# Patient Record
Sex: MALE | Race: OTHER | Employment: UNEMPLOYED | URBAN - METROPOLITAN AREA
[De-identification: names, ages, dates, MRNs, and addresses within clinical notes are randomized per-mention and may not be internally consistent; named-entity substitution may affect disease eponyms.]

---

## 2017-02-06 ENCOUNTER — OFFICE VISIT (OUTPATIENT)
Dept: HEMATOLOGY | Age: 57
End: 2017-02-06

## 2017-02-06 VITALS
BODY MASS INDEX: 27.11 KG/M2 | TEMPERATURE: 97.1 F | WEIGHT: 189.38 LBS | DIASTOLIC BLOOD PRESSURE: 69 MMHG | OXYGEN SATURATION: 93 % | HEIGHT: 70 IN | HEART RATE: 71 BPM | SYSTOLIC BLOOD PRESSURE: 122 MMHG

## 2017-02-06 DIAGNOSIS — B18.2 CHRONIC HEPATITIS C WITHOUT HEPATIC COMA (HCC): Primary | ICD-10-CM

## 2017-02-06 DIAGNOSIS — K74.60 CIRRHOSIS OF LIVER WITHOUT ASCITES, UNSPECIFIED HEPATIC CIRRHOSIS TYPE (HCC): ICD-10-CM

## 2017-02-06 PROBLEM — J44.9 COPD (CHRONIC OBSTRUCTIVE PULMONARY DISEASE) (HCC): Status: ACTIVE | Noted: 2017-02-06

## 2017-02-06 PROBLEM — D69.6 THROMBOCYTOPENIA (HCC): Status: ACTIVE | Noted: 2017-02-06

## 2017-02-06 PROBLEM — K70.31 ASCITES DUE TO ALCOHOLIC CIRRHOSIS (HCC): Status: ACTIVE | Noted: 2017-02-06

## 2017-02-06 PROBLEM — G89.29 CHRONIC MIDLINE LOW BACK PAIN: Status: ACTIVE | Noted: 2017-02-06

## 2017-02-06 PROBLEM — Z95.828 S/P TIPS (TRANSJUGULAR INTRAHEPATIC PORTOSYSTEMIC SHUNT): Status: ACTIVE | Noted: 2017-02-06

## 2017-02-06 PROBLEM — M54.50 CHRONIC MIDLINE LOW BACK PAIN: Status: ACTIVE | Noted: 2017-02-06

## 2017-02-06 RX ORDER — LACTULOSE 10 G/15ML
20 SOLUTION ORAL; RECTAL DAILY
Qty: 960 ML | Refills: 3 | Status: SHIPPED | OUTPATIENT
Start: 2017-02-06 | End: 2017-07-25 | Stop reason: SDUPTHER

## 2017-02-06 RX ORDER — FUROSEMIDE 40 MG/1
40 TABLET ORAL DAILY
Qty: 90 TAB | Refills: 3 | Status: SHIPPED | OUTPATIENT
Start: 2017-02-06

## 2017-02-06 RX ORDER — SPIRONOLACTONE 50 MG/1
100 TABLET, FILM COATED ORAL DAILY
Qty: 90 TAB | Refills: 3 | Status: SHIPPED | OUTPATIENT
Start: 2017-02-06

## 2017-02-06 RX ORDER — BACLOFEN 10 MG/1
10 TABLET ORAL
Qty: 90 TAB | Refills: 3 | Status: SHIPPED | OUTPATIENT
Start: 2017-02-06

## 2017-02-06 NOTE — MR AVS SNAPSHOT
Visit Information Date & Time Provider Department Dept. Phone Encounter #  
 2/6/2017  3:30 PM [facility] [address] [place] [address] [id number] Follow-up Instructions Return in about 2 weeks (around 2/20/2017) for FS with MLS. Upcoming Health Maintenance Date Due Hepatitis C Screening 1960 Pneumococcal 19-64 Medium Risk (1 of 1 - PPSV23) 8/26/1980 DTaP/Tdap/Td series (1 - Tdap) 8/26/1982 FOBT Q 1 YEAR AGE 50-75 8/26/2011 INFLUENZA AGE 9 TO ADULT 8/1/2016 Allergies as of 2/6/2017  Review Complete On: 2/6/2017 By: Dipti Kenny LPN No Known Allergies Current Immunizations  Never Reviewed No immunizations on file. Not reviewed this visit You Were Diagnosed With   
  
 Codes Comments Chronic hepatitis C without hepatic coma (HCC)    -  Primary ICD-10-CM: B18.2 ICD-9-CM: 070.54 Cirrhosis of liver without ascites, unspecified hepatic cirrhosis type ([facility]     ICD-10-CM: K74.60 ICD-9-CM: 571.5 Vitals BP Pulse Temp Height(growth percentile) Weight(growth percentile) SpO2  
 122/69 71 97.1 °F (36.2 °C) (Oral) 5' 10\" (1.778 m) 189 lb 6 oz (85.9 kg) 93% BMI Smoking Status 27.17 kg/m2 Current Every Day Smoker Vitals History BMI and BSA Data Body Mass Index Body Surface Area  
 27.17 kg/m 2 2.06 m 2 Preferred Pharmacy Pharmacy Name Phone [facility] [address] [place] [address] [phone] Your Updated Medication List  
  
   
This list is accurate as of: 2/6/17  5:00 PM.  Always use your most recent med list.  
  
  
  
  
 baclofen 10 mg tablet Commonly known as:  LIORESAL Take 1 Tab by mouth nightly. furosemide 40 mg tablet Commonly known as:  LASIX Take 1 Tab by mouth daily. lactulose 10 gram/15 mL solution Commonly known as:  Aloma Parent Take 30 mL by mouth daily. spironolactone 50 mg tablet Commonly known as:  ALDACTONE Take 2 Tabs by mouth daily. Prescriptions Sent to Pharmacy Refills  
 lactulose (CHRONULAC) 10 gram/15 mL solution 3 Sig: Take 30 mL by mouth daily. Class: Normal  
 Pharmacy: [address] Ph #: [phone] Route: Oral  
 furosemide (LASIX) 40 mg tablet 3 Sig: Take 1 Tab by mouth daily. Class: Normal  
 Pharmacy: [address] Ph #: [phone] Route: Oral  
 spironolactone (ALDACTONE) 50 mg tablet 3 Sig: Take 2 Tabs by mouth daily. Class: Normal  
 Pharmacy: [address] Ph #: [phone] Route: Oral  
 baclofen (LIORESAL) 10 mg tablet 3 Sig: Take 1 Tab by mouth nightly. Class: Normal  
 Pharmacy: [address] Ph #: [phone] Route: Oral  
  
We Performed the Following 7-DRUG SCREEN, WHOLE BLD I1640366 CPT(R)] AFP WITH AFP-L3% [[id number] Custom] CBC WITH AUTOMATED DIFF [63959 CPT(R)] ETHYL ALCOHOL [id number] CPT(R)] FERRITIN [61862 CPT(R)] HCV RNA BY DORI QL,RFLX TO QT [31290 CPT(R)] HCV RNA PCR QN RFX NS5A [id number] CPT(R)] HEP A AB, TOTAL S9473190 CPT(R)] HEP B SURFACE AB [id number] CPT(R)] HEP B SURFACE AG G7914129 CPT(R)] HEP C GENOTYPE [id number] CPT(R)] HEPATIC FUNCTION PANEL [13902 CPT(R)] HEPATITIS B CORE AB, TOTAL [id number] CPT(R)] IRON PROFILE [id number] CPT(R)] METABOLIC PANEL, BASIC [68501 CPT(R)] PROTHROMBIN TIME + INR [51142 CPT(R)] Follow-up Instructions Return in about 2 weeks (around 2/20/2017) for FS with MLS. To-Do List   
 02/06/2017 Imaging:  US ABD COMP Introducing hospitals & HEALTH SERVICES! Dear Lawyer Lima: Thank you for requesting a orangutrans account. Our records indicate that you already have an active orangutrans account.   You can access your account anytime at [email or web address] Did you know that you can access your hospital and ER discharge instructions at any time in CricHQ? You can also review all of your test results from your hospital stay or ER visit. Additional Information If you have questions, please visit the Frequently Asked Questions section of the CricHQ website at [email or web address] Remember, CricHQ is NOT to be used for urgent needs. For medical emergencies, dial 911. Now available from your iPhone and Android! Please provide this summary of care documentation to your next provider. Your primary care clinician is listed as Abdulaziz Lima. If you have any questions after today's visit, please call [phone].

## 2017-02-06 NOTE — LETTER
2/6/2017 7:15 PM 
 
RE:    Hina Moss [facility] [address] Thank you for agreeing to see [place] I am referring my patient to you for evaluation of ***. Please see his 
pertinent patient information below. {HISTORY MED SURG [facility] [id number]} I appreciate your assistance in Mr. Angela Montanez care  and look forward to your findings and recommendations. Sincerely, Jose A Morris MD

## 2017-02-06 NOTE — PROGRESS NOTES
Sloane Farnsworth MD, JEREMI Main PA-C Vinson Shutter, MD, Gregoria Edelson, MD Amy Delford Homans, JEREMI Brenner NP        [address] [facility], [address] [address] [place]  22.     [phone]     FAX: [address] [place] [address], [address] [place] [phone] [facility] [address]     [phone]     FAX: [phone]         Patient Care Team:  Gabriel Stewart MD as PCP - General (Family Practice)      Problem List  Date Reviewed: 2/6/2017          Codes Class Noted    Chronic hepatitis C without hepatic coma [facility]) ICD-10-CM: B18.2  ICD-9-CM: 070.54  2/6/2017        Cirrhosis of liver without ascites ([facility] ICD-10-CM: K74.60  ICD-9-CM: 571.5  2/6/2017        Thrombocytopenia ([facility] ICD-10-CM: D69.6  ICD-9-CM: 287.5  2/6/2017        Chronic midline low back pain ICD-10-CM: M54.5, G89.29  ICD-9-CM: 724.2, 338.29  2/6/2017        Ascites due to alcoholic cirrhosis ([facility] [id number]-[id number]: K70.31  ICD-9-CM: 571.2  2/6/2017        S/P TIPS (transjugular intrahepatic portosystemic shunt) ICD-10-CM: K20.091  ICD-9-CM: V45.89  2/6/2017        COPD (chronic obstructive pulmonary disease) [facility]) ICD-10-CM: J44.9  ICD-9-CM: 099  2/6/2017                The physicians listed above have asked me to see Becky Medina in consultation regarding cirrhosis secondary to chronic HCV and alcohol and its management. All medical records sent by the referring physicians were reviewed     The patient is a 55 y.o.  male who was noted to have abnormalities in liver chemistries and subsequently tested positive for chronic HCV in 2012. Risk factors for acquiring HCV are IV drug use in 1970-80s. Imaging of the liver was not recently performed. Ascites developed for the first time in 2012.   Ascites was refractory to diuretics and treated with resolved with TIPS. Edema has resolved with with diuretics and placement of TIPS. The patient has not developed hepatic encephalopathy. The patient has never received treatment for chronic HCV. The most recent laboratory studies are not available. The patient notes fatigue, diffuse abdominal pain,     The patient has not experienced problems concentrating, swelling of the abdomen, swelling of the lower extremities, hematemesis, hematochezia. The patient has limitations in functional activities which can be attributed to the liver disease. and to other medical problems that are not related to the liver disease. ALLERGIES  No Known Allergies    MEDICATIONS  Current Outpatient Prescriptions   Medication Sig    FUROSEMIDE (LASIX PO) Take 80 mg by mouth.  spironolactone (ALDACTONE) 50 mg tablet Take 50 mg by mouth daily.  lactulose (KRISTALOSE) 10 gram packet Take 10 g by mouth daily. No current facility-administered medications for this visit. SYSTEM REVIEW NOT RELATED TO LIVER DISEASE OR REVIEWED ABOVE:  Constitution systems: Negative for fever, chills, weight gain, weight loss. Eyes: Negative for visual changes. ENT: Negative for sore throat, painful swallowing. Respiratory: Negative for cough, hemoptysis, SOB. Cardiology: Negative for chest pain, palpitations. GI:  Negative for constipation or diarrhea. : Negative for urinary frequency, dysuria, hematuria, nocturia. Skin: Negative for rash. Hematology: Negative for easy bruising, blood clots. Musculo-skelatal: Severe chronic back pain for which he continues to use IV narcotics because he cannot get sufficient pain medications. Neurologic: Negative for headaches, dizziness, vertigo, memory problems not related to HE. Psychology: Negative for anxiety, depression. FAMILY HISTORY:  The father  of at age 80 years. The mother  of hear disease.     There is no family history of liver disease. SOCIAL HISTORY:  The patient is . The patient has 2 children, and 1 grandchildren. The patient currently smokes 4-6 cigarettes daily. The patient consumes 3-4 alcoholic beverages per day. The patient is currently receiving disability. PHYSICAL EXAMINATION:  Visit Vitals    /69    Pulse 71    Temp 97.1 °F (36.2 °C) (Oral)    Ht 5' 10\" (1.778 m)    Wt 189 lb 6 oz (85.9 kg)    SpO2 93%    BMI 27.17 kg/m2     General: No acute distress. Eyes: Sclera anicteric. ENT: No oral lesions. Thyroid normal.  Nodes: No adenopathy. Skin: No spider angiomata. No jaundice. No palmar erythema. Respiratory: Lungs clear to auscultation. Cardiovascular: Regular heart rate. No murmurs. No JVD. Abdomen: Soft non-tender. Liver size normal to percussion/palpation. Spleen not palpable. No obvious ascites. Extremities: No edema. No muscle wasting. No gross arthritic changes. Neurologic: Alert and oriented. Cranial nerves grossly intact. No asterixis. LABORATORY STUDIES:  [facility] of [address] [place] [address] & Units 8/8/2015 9/4/2013   WBC 4.1 - 11.1 K/uL 7.6 5.5   ANC 1.8 - 8.0 K/UL 4.1 2.8   HGB 12.1 - 17.0 g/dL 13.9 13.0    - 400 K/uL 70 (L) 64 (L)   AST 15 - 37 U/L 110 (H) 129 (H)   ALT 12 - 78 U/L 85 (H) 90 (H)   Alk Phos 45 - 117 U/L 100 106   Bili, Total 0.2 - 1.0 MG/DL 3.2 (H) 2.3 (H)   Albumin 3.5 - 5.0 g/dL 2.7 (L) 2.9 (L)   BUN 6 - 20 MG/DL 8 9   Creat 0.45 - 1.15 MG/DL 0.82 0.62   Na 136 - 145 mmol/L 143 136   K 3.5 - 5.1 mmol/L 4.0 3.0 (L)   Cl 97 - 108 mmol/L 110 (H) 98   CO2 21 - 32 mmol/L 24 26   Glucose 65 - 100 mg/dL 82 106 (H)     SEROLOGIES:  Not available or performed. Testing was performed today.     LIVER HISTOLOGY:  Not available or performed    ENDOSCOPIC PROCEDURES:  Not available or performed    RADIOLOGY:  Not available or performed    OTHER TESTING:  Not available or performed    ASSESSMENT AND PLAN:  Chronic HCV of unclear severity. Liver function is depressed. Total bilirubin is elevated. Serum albumin is depressed. The platelet count is depressed. Based upon laboratory studies the patient appears to have cirrhosis. Will perform laboratory testing to monitor liver function and degree of liver injury. This included BMP, hepatic panel, CBC with platelet count, INR. Will perform and/or review results of HCV viral load and HCV genotype to define the specific treatment and duration of treatment that will be required. Will perform serologic and virologic studies to assess for other causes of chronic liver disease. There is no reason to perform liver biopsy at this time. The Fibroscan can assess liver fibrosis and determine if a patient has advanced fibrosis or cirrhosis without the need for liver biopsy. The Fibroscan is currently available at [facility]. This will be scheduled. The patient has not previously been treated for HCV. Discussed the treatment alternatives. The SVR/cure rate for HCV now exceeds 90% with just oral anti-viral therapy and no interferon injections or significant side effects for most patients with HCV. The specific treatment is dependent upon genotype, viral load and histology. The patient was directed to continue all current medications at the current dosages. There are no contraindications for the patient to take any medications that are necessary for treatment of other medical issues. The patient has chronic back pain. He had been taking prescription narcotics for pain control. This was stopped by his other physicians and he has had to resort to buying narcotics on the street for pain control. He is scheduled to se Dr Shanna Jennings for pain management. There is no contraindication for him to be treated   Any narcotic pain medication, suboxone, or methadone as necessary to get him to stop using IV drugs.     If he continues to use drugs he would not be a good candidate for HCV treatment as there would be a significant risk of reexposure to HCV. Ascites has resolved following TIPS placement and diuretics. Lower extremity edema persistents despite TIPs and current dose of diuretics. Hepatic encephalopathy has recently recovered since he ran out of lactulose. Will re-initiate treatment with  Lactulose. No need to restrict dietary protein at this time. The patient was counseled regarding alcohol consumption. The patient was counseled regarding use of illicit drugs. The need for vaccination against viral hepatitis A and B will be assessed with serologic and instituted as appropriate. [facility]. screening will be performed. AFP was ordered today and ultrasound will be scheduled. 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 No treatment necessary. Platelet count is adequate     Anemia may be secondary to low iron stores. Will check ferritin and iron panel. If iron deficient will supplement with oral iron and evaluate for GI blood loss. All of the above issues were discussed with the patient. All questions were answered. The patient expressed a clear understanding of the above. [address] in 2 weeks to review all data and determine the treatment plan.     Colin Porter MD  [facility] [place] of [address] [facility] 2718 [facility] [address], [facility] 22.  [phone]

## 2017-02-07 ENCOUNTER — TELEPHONE (OUTPATIENT)
Dept: HEMATOLOGY | Age: 57
End: 2017-02-07

## 2017-02-07 LAB
ALBUMIN SERPL-MCNC: 3.1 G/DL (ref 3.5–5.5)
ALP SERPL-CCNC: 81 IU/L (ref 39–117)
ALT SERPL-CCNC: 89 IU/L (ref 0–44)
AST SERPL-CCNC: 152 IU/L (ref 0–40)
BASOPHILS # BLD AUTO: 0 X10E3/UL (ref 0–0.2)
BASOPHILS NFR BLD AUTO: 0 %
BILIRUB DIRECT SERPL-MCNC: 1.25 MG/DL (ref 0–0.4)
BILIRUB SERPL-MCNC: 2.2 MG/DL (ref 0–1.2)
BUN SERPL-MCNC: 12 MG/DL (ref 6–24)
BUN/CREAT SERPL: 16 (ref 9–20)
CALCIUM SERPL-MCNC: 8.8 MG/DL (ref 8.7–10.2)
CHLORIDE SERPL-SCNC: 105 MMOL/L (ref 96–106)
CO2 SERPL-SCNC: 26 MMOL/L (ref 18–29)
CREAT SERPL-MCNC: 0.77 MG/DL (ref 0.76–1.27)
EOSINOPHIL # BLD AUTO: 0 X10E3/UL (ref 0–0.4)
EOSINOPHIL NFR BLD AUTO: 1 %
ERYTHROCYTE [DISTWIDTH] IN BLOOD BY AUTOMATED COUNT: 14.5 % (ref 12.3–15.4)
ETHANOL BLD GC-MCNC: NEGATIVE %
FERRITIN SERPL-MCNC: 464 NG/ML (ref 30–400)
GLUCOSE SERPL-MCNC: 89 MG/DL (ref 65–99)
HAV AB SER QL IA: NEGATIVE
HBV CORE AB SERPL QL IA: POSITIVE
HBV SURFACE AB SER QL: REACTIVE
HBV SURFACE AG SERPL QL IA: NEGATIVE
HCT VFR BLD AUTO: 38.4 % (ref 37.5–51)
HGB BLD-MCNC: 13.3 G/DL (ref 12.6–17.7)
IMM GRANULOCYTES # BLD: 0 X10E3/UL (ref 0–0.1)
IMM GRANULOCYTES NFR BLD: 0 %
INR PPP: 1.3 (ref 0.8–1.2)
IRON SATN MFR SERPL: 43 % (ref 15–55)
IRON SERPL-MCNC: 87 UG/DL (ref 38–169)
LYMPHOCYTES # BLD AUTO: 1.4 X10E3/UL (ref 0.7–3.1)
LYMPHOCYTES NFR BLD AUTO: 35 %
MCH RBC QN AUTO: 32.2 PG (ref 26.6–33)
MCHC RBC AUTO-ENTMCNC: 34.6 G/DL (ref 31.5–35.7)
MCV RBC AUTO: 93 FL (ref 79–97)
MONOCYTES # BLD AUTO: 0.6 X10E3/UL (ref 0.1–0.9)
MONOCYTES NFR BLD AUTO: 14 %
MORPHOLOGY BLD-IMP: ABNORMAL
NEUTROPHILS # BLD AUTO: 1.9 X10E3/UL (ref 1.4–7)
NEUTROPHILS NFR BLD AUTO: 50 %
PLATELET # BLD AUTO: 65 X10E3/UL (ref 150–379)
POTASSIUM SERPL-SCNC: 4.6 MMOL/L (ref 3.5–5.2)
PROT SERPL-MCNC: 5.5 G/DL (ref 6–8.5)
PROTHROMBIN TIME: 13.4 SEC (ref 9.1–12)
RBC # BLD AUTO: 4.13 X10E6/UL (ref 4.14–5.8)
SODIUM SERPL-SCNC: 143 MMOL/L (ref 134–144)
TIBC SERPL-MCNC: 204 UG/DL (ref 250–450)
UIBC SERPL-MCNC: 117 UG/DL (ref 111–343)
WBC # BLD AUTO: 3.9 X10E3/UL (ref 3.4–10.8)

## 2017-02-07 NOTE — LETTER
2/9/2017 1:02 PM 
 
Mr. Chon Navas 
[address] [address] To whom it may concern; The patient listed above may have pain medication as needed. Any questions please call the office. Sincerely, Laura Oviedo MD

## 2017-02-07 NOTE — TELEPHONE ENCOUNTER
Patients spouse called and said that he had came in yesterday for an appointment and was told a letter would be sent to Dr. Saw Ward that states he can be given pain medication. Please fax this letter to his office at the [address] [place] [address], their fax number is [phone].  Please give his spouse a call back when this has been done at [phone]

## 2017-02-08 LAB
AFP L3 MFR SERPL: 9.6 % (ref 0–9.9)
AFP SERPL-MCNC: 4.1 NG/ML (ref 0–8)
HCV GENTYP SERPL NAA+PROBE: NORMAL
PLEASE NOTE, 550474: NORMAL

## 2017-02-09 LAB
HCV RNA SERPL NAA+PROBE-ACNC: NORMAL IU/ML
HCV RNA SERPL NAA+PROBE-LOG IU: 4.2 LOG10 IU/ML
HCV RNA SERPL QL NAA+PROBE: POSITIVE
TEST INFORMATION: NORMAL

## 2017-02-11 LAB
BZE SPEC-MCNC: 79 NG/ML
COCAINE SPEC QL CFM: POSITIVE
COCAINE SPEC-MCNC: NEGATIVE NG/ML

## 2017-02-18 LAB
6-ACETYLMORPHINE: NEGATIVE
AMPHETAMINES BLD QL SCN: NEGATIVE NG/ML
BARBITURATES SERPLBLD QL: NEGATIVE UG/ML
CANNABINOIDS BLD QL SCN: NEGATIVE NG/ML
COCAINE+BZE SERPLBLD QL SCN: ABNORMAL NG/ML
CODEINE, 737848: NEGATIVE NG/ML
DIHYDROCODEINE, 764159: NEGATIVE NG/ML
HYDROCODONE, 737854: NEGATIVE NG/ML
HYDROMORPHONE, 737852: NEGATIVE NG/ML
MORPHINE, 737850: 13.8 NG/ML
OPIATES BLD QL SCN: ABNORMAL NG/ML
OPIATES SPEC QL: POSITIVE
OXYCODONES, 738315: NEGATIVE NG/ML
PCP BLD QL SCN: NEGATIVE NG/ML

## 2017-02-21 ENCOUNTER — HOSPITAL ENCOUNTER (OUTPATIENT)
Dept: ULTRASOUND IMAGING | Age: 57
Discharge: HOME | End: 2017-02-21
Attending: INTERNAL MEDICINE
Payer: MEDICARE

## 2017-02-21 DIAGNOSIS — K74.60 CIRRHOSIS OF LIVER WITHOUT ASCITES, UNSPECIFIED HEPATIC CIRRHOSIS TYPE (HCC): ICD-10-CM

## 2017-02-21 PROCEDURE — 76700 US EXAM ABDOM COMPLETE: CPT

## 2017-03-21 ENCOUNTER — OFFICE VISIT (OUTPATIENT)
Dept: HEMATOLOGY | Age: 57
End: 2017-03-21

## 2017-03-21 VITALS
SYSTOLIC BLOOD PRESSURE: 126 MMHG | HEIGHT: 70 IN | WEIGHT: 180.13 LBS | BODY MASS INDEX: 25.79 KG/M2 | TEMPERATURE: 98.1 F | OXYGEN SATURATION: 97 % | HEART RATE: 67 BPM | DIASTOLIC BLOOD PRESSURE: 74 MMHG

## 2017-03-21 DIAGNOSIS — B18.2 CHRONIC HEPATITIS C WITHOUT HEPATIC COMA (HCC): Primary | ICD-10-CM

## 2017-03-21 NOTE — LETTER
4/16/2017 3:54 PM 
 
RE:    Hina Moss [facility] [address] Thank you for agreeing to see [facility] I am referring my patient to you for evaluation of ***. Please see his 
pertinent patient information below. {[facility] MED SURG [facility] [id number]} I appreciate your assistance in Mr. Mitchell Fulton care  and look forward to your findings and recommendations. Sincerely, Lillian Crowe MD

## 2017-03-21 NOTE — MR AVS SNAPSHOT
Visit Information Date & Time Provider Department Dept. Phone Encounter #  
 3/21/2017  4:00 PM Luna Cuenca MD [facility] [address] [place] [address] [id number] Follow-up Instructions Return for NP. Upcoming Health Maintenance Date Due Pneumococcal 19-64 Medium Risk (1 of 1 - PPSV23) 8/26/1980 DTaP/Tdap/Td series (1 - Tdap) 8/26/1982 FOBT Q 1 YEAR AGE 50-75 8/26/2011 INFLUENZA AGE 9 TO ADULT 8/1/2016 Allergies as of 3/21/2017  Review Complete On: 3/21/2017 By: Em Palomo LPN No Known Allergies Current Immunizations  Never Reviewed No immunizations on file. Not reviewed this visit Vitals BP Pulse Temp Height(growth percentile) Weight(growth percentile) SpO2  
 126/74 67 98.1 °F (36.7 °C) (Tympanic) 5' 10\" (1.778 m) 180 lb 2 oz (81.7 kg) 97% BMI Smoking Status 25.85 kg/m2 Current Every Day Smoker BMI and BSA Data Body Mass Index Body Surface Area  
 25.85 kg/m 2 2.01 m 2 Preferred Pharmacy Pharmacy Name Phone [facility] [address] [place] [address] [phone] Your Updated Medication List  
  
   
This list is accurate as of: 3/21/17  4:56 PM.  Always use your most recent med list.  
  
  
  
  
 baclofen 10 mg tablet Commonly known as:  LIORESAL Take 1 Tab by mouth nightly. furosemide 40 mg tablet Commonly known as:  LASIX Take 1 Tab by mouth daily. lactulose 10 gram/15 mL solution Commonly known as:  Ottis Brill Take 30 mL by mouth daily. spironolactone 50 mg tablet Commonly known as:  ALDACTONE Take 2 Tabs by mouth daily. Follow-up Instructions Return for NP. Introducing [facility] SERVICES! Dear Hayley Likens: Thank you for requesting a "[employer] account. Our records indicate that you already have an active "[employer] account. You can access your account anytime at [email or web address] Did you know that you can access your hospital and ER discharge instructions at any time in OpenNews? You can also review all of your test results from your hospital stay or ER visit. Additional Information If you have questions, please visit the Frequently Asked Questions section of the OpenNews website at [email or web address] Remember, OpenNews is NOT to be used for urgent needs. For medical emergencies, dial 911. Now available from your iPhone and Android! Please provide this summary of care documentation to your next provider. Your primary care clinician is listed as Mayra Rendon. If you have any questions after today's visit, please call [phone].

## 2017-03-31 ENCOUNTER — OFFICE VISIT (OUTPATIENT)
Dept: HEMATOLOGY | Age: 57
End: 2017-03-31

## 2017-03-31 VITALS
WEIGHT: 176.8 LBS | BODY MASS INDEX: 25.37 KG/M2 | SYSTOLIC BLOOD PRESSURE: 133 MMHG | DIASTOLIC BLOOD PRESSURE: 75 MMHG | OXYGEN SATURATION: 96 % | TEMPERATURE: 99.2 F | HEART RATE: 77 BPM

## 2017-03-31 DIAGNOSIS — K74.60 CIRRHOSIS OF LIVER WITHOUT ASCITES, UNSPECIFIED HEPATIC CIRRHOSIS TYPE (HCC): ICD-10-CM

## 2017-03-31 DIAGNOSIS — K74.60 CIRRHOSIS OF LIVER WITHOUT ASCITES, UNSPECIFIED HEPATIC CIRRHOSIS TYPE (HCC): Primary | ICD-10-CM

## 2017-03-31 NOTE — PROGRESS NOTES
93 Linda Gonzales MD, JEREMI Espinosa PA-C Jonas Pigg, MD, MD Tamar Mcleod NP Kimble Reels, NP        [address] [facility], [address] Anne [address]  22.     [phone]     FAX: [address], [address] [place] [address], [address]     [phone]     FAX: [phone]         Patient Care Team:  Royd Martinez MD as PCP - General (Family Practice)  Seth Talbot MD (Physical Medicine and Rehab)      Problem List  Date Reviewed: 2/6/2017          Codes Class Noted    Chronic hepatitis C without hepatic coma [facility]) ICD-10-CM: B18.2  ICD-9-CM: 070.54  2/6/2017        Cirrhosis of liver without ascites ([facility].) ICD-10-CM: K74.60  ICD-9-CM: 571.5  2/6/2017        Thrombocytopenia ([facility].) ICD-10-CM: D69.6  ICD-9-CM: 287.5  2/6/2017        Chronic midline low back pain ICD-10-CM: M54.5, G89.29  ICD-9-CM: 724.2, 338.29  2/6/2017        Ascites due to alcoholic cirrhosis ([facility] 75.) [id number]: K70.31  ICD-9-CM: 571.2  2/6/2017        S/P TIPS (transjugular intrahepatic portosystemic shunt) ICD-10-CM: H51.017  ICD-9-CM: V45.89  2/6/2017        COPD (chronic obstructive pulmonary disease) ([facility].) ICD-10-CM: J44.9  ICD-9-CM: 203  2/6/2017              Rogelio Don returns to the [address] for management of cirrhosis secondary to chronic HCV and alcoholic liver disease. The active problem list, all pertinent past medical history, medications, radiologic findings and laboratory findings related to the liver disorder were reviewed with the patient. The patient is a 55 y.o.  male who was noted to have abnormalities in liver chemistries and subsequently tested positive for chronic HCV in 2012. Risk factors for acquiring HCV are IV drug use in 1970-80s.         Ascites developed for the first time in 2012. Ascites was refractory to diuretics and treated with resolved with TIPS. Edema has largely resolved with with diuretics and placement of TIPS. He is variable in administration of low dose diuretics due to cramping associated with these medications. The patient has had variable hepatic encephalopathy due to the placement of the TIPS. He has a prescription for lactulose that he takes only on an intermittent basis. He has been treated in the past with rifaxamin, but did not see that this medication was of any added benefit and discontinued its use. He continues to have some days of increasing confusion. Imaging of the liver was recently performed in 2/2017, this showed the TIPS to be patent and no evidence of liver mass/lesion. The patient has never received treatment for chronic HCV. The most recent laboratory studies indicate that the liver transaminases are elevated, alkaline phosphatase is normal,tests of hepatic synthetic and metabolic function are depressed, total bilirubin is elevated, INR is elevated, albumin is  depressed, and the platelet count is depressed. The patient notes fatigue, diffuse abdominal pain, and chronic back pain issues. The patient has not experienced problems concentrating, swelling of the abdomen, swelling of the lower extremities, hematemesis, hematochezia. The patient has limitations in functional activities which can be attributed to the liver disease. and to other medical problems that are not related to the liver disease. ALLERGIES  No Known Allergies    MEDICATIONS  Current Outpatient Prescriptions   Medication Sig    lactulose (CHRONULAC) 10 gram/15 mL solution Take 30 mL by mouth daily.  furosemide (LASIX) 40 mg tablet Take 1 Tab by mouth daily.  spironolactone (ALDACTONE) 50 mg tablet Take 2 Tabs by mouth daily.  baclofen (LIORESAL) 10 mg tablet Take 1 Tab by mouth nightly.      No current facility-administered medications for this visit. SYSTEM REVIEW NOT RELATED TO LIVER DISEASE OR REVIEWED ABOVE:  Constitution systems: Negative for fever, chills, weight gain, weight loss. Eyes: Negative for visual changes. ENT: Negative for sore throat, painful swallowing. Respiratory: Negative for cough, hemoptysis, SOB. Cardiology: Negative for chest pain, palpitations. GI:  Negative for constipation or diarrhea. : Negative for urinary frequency, dysuria, hematuria, nocturia. Skin: Negative for rash. Hematology: Negative for easy bruising, blood clots. Musculo-skeletal: Severe chronic back pain for which he continues to use IV narcotics because he cannot get sufficient pain medications. Neurologic: Negative for headaches, dizziness, vertigo, memory problems not related to HE. Psychology: Negative for anxiety, depression. FAMILY HISTORY:  The father  of at age 80 years. The mother  of hear disease. There is no family history of liver disease. SOCIAL HISTORY:  The patient is . The patient has 2 children, and 1 grandchildren. The patient currently smokes 4-6 cigarettes daily. The patient consumes 3-4 alcoholic beverages per day. The patient is currently receiving disability. PHYSICAL EXAMINATION:  Visit Vitals    /75    Pulse 77    Temp 99.2 °F (37.3 °C) (Tympanic)    Wt 176 lb 12.8 oz (80.2 kg)    SpO2 96%    BMI 25.37 kg/m2     General: No acute distress. Eyes: Sclera anicteric. ENT: No oral lesions. Thyroid normal.  Nodes: No adenopathy. Skin: No spider angiomata. No jaundice. No palmar erythema. Respiratory: Lungs clear to auscultation. Cardiovascular: Regular heart rate. No murmurs. No JVD. Abdomen: Soft non-tender. Liver size normal to percussion/palpation. Spleen not palpable. No obvious ascites. Extremities: Trace edema. No muscle wasting. No gross arthritic changes. Neurologic: Alert and oriented. Cranial nerves grossly intact. Fine asterixis. LABORATORY STUDIES:  [facility] [place] [facility] Units 2/6/2017 8/8/2015 9/4/2013   WBC 3.4 - 10.8 x10E3/uL 3.9 7.6 5.5   ANC 1.4 - 7.0 x10E3/uL 1.9 4.1 2.8   HGB 12.6 - 17.7 g/dL 13.3 13.9 13.0    - 379 x10E3/uL 65 (LL) 70 (L) 64 (L)   INR 0.8 - 1.2 1.3 (H)     AST 0 - 40 IU/L 152 (H) 110 (H) 129 (H)   ALT 0 - 44 IU/L 89 (H) 85 (H) 90 (H)   Alk Phos 39 - 117 IU/L 81 100 106   Bili, Total 0.0 - 1.2 mg/dL 2.2 (H) 3.2 (H) 2.3 (H)   Bili, Direct 0.00 - 0.40 mg/dL 1.25 (H)     Albumin 3.5 - 5.5 g/dL 3.1 (L) 2.7 (L) 2.9 (L)   BUN 6 - 24 mg/dL 12 8 9   Creat 0.76 - 1.27 mg/dL 0.77 0.82 0.62   Na 134 - 144 mmol/L 143 143 136   K 3.5 - 5.2 mmol/L 4.6 4.0 3.0 (L)   Cl 96 - 106 mmol/L 105 110 (H) 98   CO2 18 - 29 mmol/L 26 24 26   Glucose 65 - 99 mg/dL 89 82 106 (H)     SEROLOGIES:  Serologies Latest Ref Rng & Units 2/6/2017   Hep A Ab, Total Negative Negative   Hep B Surface Ag Negative Negative   Hep B Core Ab, Total Negative Positive (A)   Hep B Surface AB QL  Reactive   Hep C Genotype  2b   HCV RT-PCR, Quant IU/mL 98031   Ferritin 30 - 400 ng/mL 464 (H)   Iron % Saturation 15 - 55 % 43   Additional lab values drawn at today's office visit are pending at the time of documentation. LIVER HISTOLOGY:  Not available or performed    ENDOSCOPIC PROCEDURES:  Not available or performed, s/p TIPS    RADIOLOGY:  2/2017. Ultrasound of liver. Echogenic consistent with cirrhosis. No liver mass lesions. No dilated bile ducts. No ascites. TIPS is patent. OTHER TESTING:  Not available or performed    ASSESSMENT AND PLAN:  Chronic HCV in the setting of cirrhosis (clinically). Liver function is depressed. Total bilirubin is elevated. Serum albumin is depressed. The platelet count is depressed. Based upon laboratory studies the patient appears to have cirrhosis.   I have discussed with the patient that this will likely need to be confirmed with fibroscan imaging, this will be scheduled for his next office visit. The Fibroscan can assess liver fibrosis and determine if a patient has advanced fibrosis or cirrhosis without the need for liver biopsy. The Fibroscan is currently available at liver [place]. Will perform laboratory testing to monitor liver function and degree of liver injury. This included BMP, hepatic panel, CBC with platelet count, INR. Patient has had a depressed function score at his last office visit. Likely due to ongoing alcohol use. He has tried to cut back and I will obtain function at this time. Serologic and virologic studies to assess for other causes of chronic liver disease were negative. The patient has genotype 2b infection. The patient has not previously been treated for HCV. We have discussed the treatment alternatives. The SVR/cure rate for HCV now exceeds 90% with just oral anti-viral therapy and no interferon injections or significant side effects for most patients with HCV. The specific treatment is dependent upon genotype, viral load and histology. I have reviewed with him that given his underlying mildly decompensated liver disease that use of Epclusa in combination with ribavirin would prove to be the most reasonable approach to management of his HCV infection. This could only be used once he has committed to being drug and alcohol free. I will continue to assess at future follow-up office visits. The patient has chronic back pain. He had been taking narcotics and alcohol for pain control. He currently has no other long-term strategy for pain management. I have asked him to contact his PCP office for referral to chronic pain management services. This will heavily influence our plan of care as no medication for HCV or liver disease can be pursued without resolution of his abuse of these substances.    There is no contraindication for him to be treated any narcotic pain medication, suboxone, or methadone as necessary to get him to stop using illicit drugs. If he continues to use drugs he would not be a good candidate for HCV treatment as there would be a significant risk of reexposure to HCV. The patient was directed to continue all current medications at the current dosages. There are no contraindications for the patient to take any medications that are necessary for treatment of other medical issues. Ascites has resolved following TIPS placement and diuretics. Lower extremity edema persists to a minimal degree despite TIPs and current dose of diuretics. Hepatic encephalopathy has recently returned as he has been noncompliant with lactulose. I have discussed with him how to titrate this medication to achieve the target dose of 2-3 bowel movements daily. No need to restrict dietary protein at this time. The patient was counseled regarding alcohol consumption. I have advised that he must stop all alcohol in no uncertain terms. The patient was counseled regarding use of illicit drugs. He will need an alternate long-term strategy for management of his chronic back pain. Vaccination for viral hepatitis A is recommended since the patient has no serologic evidence of previous exposure or vaccination with immunity. Vaccination for viral hepatitis B is not needed. The patient has serologic evidence of prior exposure or vaccination with immunity. [facility] was [facility] performed. AFP and repeat US will continued to be monitored in the future. Next ultrasound with duplex will be scheduled for 8/2017. 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 No treatment necessary. Platelet count is adequate     All of the above issues were discussed with the patient. All questions were answered. The patient expressed a clear understanding of the above.     [facility] 87 in 4 weeks with fibroscan to review all data and determine the treatment plan.       Armando Lugo PA-C  L[facility] [place] of [address] [place] [address], [address] [facility], [facility]  22.  [phone]

## 2017-04-01 LAB
ALBUMIN SERPL-MCNC: 3.1 G/DL (ref 3.5–5.5)
ALP SERPL-CCNC: 89 IU/L (ref 39–117)
ALT SERPL-CCNC: 57 IU/L (ref 0–44)
AST SERPL-CCNC: 105 IU/L (ref 0–40)
BILIRUB DIRECT SERPL-MCNC: 1.88 MG/DL (ref 0–0.4)
BILIRUB SERPL-MCNC: 4 MG/DL (ref 0–1.2)
BUN SERPL-MCNC: 10 MG/DL (ref 6–24)
BUN/CREAT SERPL: 14 (ref 9–20)
CALCIUM SERPL-MCNC: 9 MG/DL (ref 8.7–10.2)
CHLORIDE SERPL-SCNC: 103 MMOL/L (ref 96–106)
CO2 SERPL-SCNC: 26 MMOL/L (ref 18–29)
CREAT SERPL-MCNC: 0.7 MG/DL (ref 0.76–1.27)
ERYTHROCYTE [DISTWIDTH] IN BLOOD BY AUTOMATED COUNT: 16 % (ref 12.3–15.4)
GLUCOSE SERPL-MCNC: 112 MG/DL (ref 65–99)
HCT VFR BLD AUTO: 43.3 % (ref 37.5–51)
HGB BLD-MCNC: 14.8 G/DL (ref 12.6–17.7)
INR PPP: 1.3 (ref 0.8–1.2)
MCH RBC QN AUTO: 32.1 PG (ref 26.6–33)
MCHC RBC AUTO-ENTMCNC: 34.2 G/DL (ref 31.5–35.7)
MCV RBC AUTO: 94 FL (ref 79–97)
MORPHOLOGY BLD-IMP: ABNORMAL
PLATELET # BLD AUTO: 62 X10E3/UL (ref 150–379)
POTASSIUM SERPL-SCNC: 4.5 MMOL/L (ref 3.5–5.2)
PROTHROMBIN TIME: 13.5 SEC (ref 9.1–12)
RBC # BLD AUTO: 4.61 X10E6/UL (ref 4.14–5.8)
SODIUM SERPL-SCNC: 141 MMOL/L (ref 134–144)
WBC # BLD AUTO: 7.1 X10E3/UL (ref 3.4–10.8)

## 2017-04-04 LAB — HIV2 GP36 AB SER QL IB: NEGATIVE

## 2017-04-04 NOTE — PROGRESS NOTES
Pt notified of liver function showing increase in bilirubin. Advised abstinence from alcohol and repeat within one month for trending of function.

## 2017-05-19 ENCOUNTER — OFFICE VISIT (OUTPATIENT)
Dept: HEMATOLOGY | Age: 57
End: 2017-05-19

## 2017-05-19 VITALS
DIASTOLIC BLOOD PRESSURE: 64 MMHG | SYSTOLIC BLOOD PRESSURE: 111 MMHG | TEMPERATURE: 98.6 F | WEIGHT: 171.6 LBS | HEART RATE: 71 BPM | HEIGHT: 70 IN | BODY MASS INDEX: 24.57 KG/M2 | OXYGEN SATURATION: 95 %

## 2017-05-19 DIAGNOSIS — K74.60 CIRRHOSIS OF LIVER WITHOUT ASCITES, UNSPECIFIED HEPATIC CIRRHOSIS TYPE (HCC): Primary | ICD-10-CM

## 2017-05-19 NOTE — MR AVS SNAPSHOT
Visit Information Date & Time Provider Department Dept. Phone Encounter #  
 5/19/2017  2:30 PM Arthea Brunner, PA [facility] [address] [place] [address] [id number] Upcoming Health Maintenance Date Due Pneumococcal 19-64 Medium Risk (1 of 1 - PPSV23) 8/26/1980 DTaP/Tdap/Td series (1 - Tdap) 8/26/1982 FOBT Q 1 YEAR AGE 50-75 8/26/2011 INFLUENZA AGE 9 TO ADULT 8/1/2017 Allergies as of 5/19/2017  Review Complete On: 5/19/2017 By: Ilya Starr No Known Allergies Current Immunizations  Never Reviewed No immunizations on file. Not reviewed this visit You Were Diagnosed With   
  
 Codes Comments Cirrhosis of liver without ascites, unspecified hepatic cirrhosis type ([facility]    -  Primary ICD-10-CM: K74.60 ICD-9-CM: 571.5 Vitals BP Pulse Temp Height(growth percentile) Weight(growth percentile) SpO2  
 111/64 (BP 1 Location: Left arm, BP Patient Position: Sitting) 71 98.6 °F (37 °C) (Tympanic) 5' 10\" (1.778 m) 171 lb 9.6 oz (77.8 kg) 95% BMI Smoking Status 24.62 kg/m2 Current Every Day Smoker BMI and BSA Data Body Mass Index Body Surface Area  
 24.62 kg/m 2 1.96 m 2 Preferred Pharmacy Pharmacy Name Phone [facility] [address] [place] [address] [phone] Your Updated Medication List  
  
   
This list is accurate as of: 5/19/17  3:17 PM.  Always use your most recent med list.  
  
  
  
  
 baclofen 10 mg tablet Commonly known as:  LIORESAL Take 1 Tab by mouth nightly. furosemide 40 mg tablet Commonly known as:  LASIX Take 1 Tab by mouth daily. lactulose 10 gram/15 mL solution Commonly known as:  Enolia Bodo Take 30 mL by mouth daily. spironolactone 50 mg tablet Commonly known as:  ALDACTONE Take 2 Tabs by mouth daily. We Performed the Following CBC W/O DIFF [47967 CPT(R)] HEPATIC FUNCTION PANEL (6) [[id number] Custom] METABOLIC PANEL, BASIC [04749 CPT(R)] PROTHROMBIN TIME + INR [56600 CPT(R)] To-Do List   
 06/09/2017 11:15 AM  
  Appointment with GAVINO Flanagan at [facility] ([phone]) Patient Instructions FIBROSCAN PATIENT INFORMATION What is Fibroscan:? 
 
Fibroscan is an ultrasound device that measures liver stiffness by sending a pulse of vibrations through the liver. This translated into an immediate result that can help your healthcare team determine the level of damage to the liver as well as monitor the condition of various liver diseases over time. Fibroscan is helpful in the evaluation of the following conditions: 
 
Chronic Hepatitis C Chronic Hepatitis B Fatty Liver Disease Alcohol Liver Disease Chronic Cholestatic Liver Diseases What happens During the Scan? Patients receiving this exam lie flat on an examination table and raise the right arm above the head. The skin over the right lower rib cage is exposed and the examiner locates the correct area to be scanned. The prove of the scanner is placed directly on the patient and triggered to start. This fells like a gentle flick against the skin and should not be uncomfortable. At least ten (10) readings are taken and the average is calculated to score the amount of liver stiffness or scar tissue. The exam should take 10-20 minutes. What do I need to do to prepare for the scan? Please do not eat or drink anything 2-4 hours  Before your Fibroscan. You should continue taking any prescribed medication and can take small sips of water or clear fluid to do so,  But avoid drinking large amounts of fluid. Please dress comfortably in clothes that will allow for easy access to the right side of the abdomen. Women are discouraged from wearing a dress on the day of the exam. 
 
Are there any special precautions? Patients who are pregnant or have an implantable device (for example, pacemaker or defibrillator) should not have this exam performed. Patients with a significant amount of fat tissue in the area the probe is pressed may be unable to have test performed. Introducing [facility] SERVICES! Dear Tamara Coffman: Thank you for requesting a [employer] account. Our records indicate that you already have an active [employer] account. You can access your account anytime at [email or web address] Did you know that you can access your hospital and ER discharge instructions at any time in [employer]? You can also review all of your test results from your hospital stay or ER visit. Additional Information If you have questions, please visit the Frequently Asked Questions section of the Pe[employer]ap[employer] website at [email or web address] Remember, Peek[employer]ak is NOT to be used for urgent needs. For medical emergencies, dial 911. Now available from your iPhone and Android! Please provide this summary of care documentation to your next provider. Your primary care clinician is listed as Rita Urbina. If you have any questions after today's visit, please call [phone].

## 2017-05-19 NOTE — PROGRESS NOTES
93 Linda Gonzales MD, JEREMI Aguillon PA-C Cordie Edman, MD, MD Tamar Mosquera NP Annamarie Denmark, NP        [address] [facility], [address] [address]  22.     [phone]     FAX: [address] [place] [address], [address] [place] [address] [facility] - [address]     [phone]     FAX: [phone]         Patient Care Team:  Indiana Swartz MD as PCP - General (Family Practice)  Jimy Marrero MD (Physical Medicine and Rehab)      Problem List  Date Reviewed: 4/16/2017          Codes Class Noted    Chronic hepatitis C without hepatic coma [facility]) ICD-10-CM: B18.2  ICD-9-CM: 070.54  2/6/2017        Cirrhosis of liver without ascites ([facility] ICD-10-CM: K74.60  ICD-9-CM: 571.5  2/6/2017        Thrombocytopenia ([facility] ICD-10-CM: D69.6  ICD-9-CM: 287.5  2/6/2017        Chronic midline low back pain ICD-10-CM: M54.5, G89.29  ICD-9-CM: 724.2, 338.29  2/6/2017        Ascites due to alcoholic cirrhosis ([facility] JGL-40-IE: K70.31  ICD-9-CM: 571.2  2/6/2017        S/P TIPS (transjugular intrahepatic portosystemic shunt) ICD-10-CM: G36.050  ICD-9-CM: V45.89  2/6/2017        COPD (chronic obstructive pulmonary disease) ([facility] ICD-10-CM: J44.9  ICD-9-CM: 570  2/6/2017              Prince Ivan returns to the [address] for management of cirrhosis secondary to chronic HCV and alcoholic liver disease. The active problem list, all pertinent past medical history, medications, radiologic findings and laboratory findings related to the liver disorder were reviewed with the patient. The patient is a 55 y.o.  male who was noted to have abnormalities in liver chemistries and subsequently tested positive for chronic HCV in 2012. Risk factors for acquiring HCV are IV drug use in 1970-80s.         Ascites developed for the first time in 2012. Ascites was refractory to diuretics and treated with resolved with TIPS. Edema has largely resolved with with diuretics and placement of TIPS. He is variable in administration of low dose diuretics due to cramping associated with these medications, and reports that he has not been taking these medications recently. The patient has had variable hepatic encephalopathy due to the placement of the TIPS. He has a prescription for lactulose that he takes only on an intermittent basis. He has been treated in the past with rifaxamin, but did not see that this medication was of any added benefit and discontinued its use. He continues to have some days of increasing confusion and falling episodes that he relates to his back and LE weakness. Patient reports that he usually has 1 bowel movement daily. Imaging of the liver was recently performed in 2/2017, this showed the TIPS to be patent and no evidence of liver mass/lesion. The patient has never received treatment for chronic HCV. There have been some delays due to underlying liver function and lab findings. He in interested in starting medications when possible. The most recent laboratory studies indicate that the liver transaminases are elevated, alkaline phosphatase is normal,tests of hepatic synthetic and metabolic function are depressed, total bilirubin is elevated, INR is elevated, albumin is  depressed, and the platelet count is depressed. The patient notes fatigue, diffuse abdominal pain, and chronic back pain issues. The patient has not experienced problems concentrating, swelling of the abdomen, swelling of the lower extremities, hematemesis, hematochezia. The patient has limitations in functional activities which can be attributed to the liver disease. and to other medical problems that are not related to the liver disease.     ALLERGIES  No Known Allergies    MEDICATIONS  Current Outpatient Prescriptions   Medication Sig    lactulose (CHRONULAC) 10 gram/15 mL solution Take 30 mL by mouth daily.  baclofen (LIORESAL) 10 mg tablet Take 1 Tab by mouth nightly.  furosemide (LASIX) 40 mg tablet Take 1 Tab by mouth daily.  spironolactone (ALDACTONE) 50 mg tablet Take 2 Tabs by mouth daily. (Patient taking differently: Take 50 mg by mouth daily.)     No current facility-administered medications for this visit. SYSTEM REVIEW NOT RELATED TO LIVER DISEASE OR REVIEWED ABOVE:  Constitution systems: Negative for fever, chills, weight gain. Weight loss of 7-8 # since the last office visit. Eyes: Negative for visual changes. ENT: Negative for sore throat, painful swallowing. Respiratory: Negative for cough, hemoptysis, SOB. Cardiology: Negative for chest pain, palpitations. GI:  Negative for constipation or diarrhea. : Negative for urinary frequency, dysuria, hematuria, nocturia. Skin: Negative for rash. Hematology: Negative for easy bruising, blood clots. Musculo-skeletal: Severe chronic back pain. Neurologic: Negative for headaches, dizziness, vertigo, memory problems not related to HE. Psychology: Negative for anxiety, depression. FAMILY HISTORY:  The father  of at age 80 years. The mother  of heart disease. There is no family history of liver disease. SOCIAL HISTORY:  The patient is . The patient has 2 children, and 1 grandchildren. The patient currently smokes 4-6 cigarettes daily. The patient consumes ~3 alcoholic beverages per day. The patient is currently receiving disability. PHYSICAL EXAMINATION:  Visit Vitals    /64 (BP 1 Location: Left arm, BP Patient Position: Sitting)    Pulse 71    Temp 98.6 °F (37 °C) (Tympanic)    Ht 5' 10\" (1.778 m)    Wt 171 lb 9.6 oz (77.8 kg)    SpO2 95%    BMI 24.62 kg/m2     General: No acute distress. Eyes: Sclera anicteric. ENT: No oral lesions.   Thyroid normal.  Nodes: No adenopathy. Skin: No spider angiomata. No jaundice. No palmar erythema. Respiratory: Lungs clear to auscultation. Cardiovascular: Regular heart rate. No murmurs. No JVD. Abdomen: Soft non-tender. Liver size normal to percussion/palpation. Spleen not palpable. No obvious ascites. Extremities: Trace edema. No muscle wasting. No gross arthritic changes. Neurologic: Alert and oriented. Cranial nerves grossly intact. Fine asterixis. LABORATORY STUDIES:  [facility] of [address] [place] [address] & Units 3/31/2017 2/6/2017 8/8/2015   WBC 3.4 - 10.8 x10E3/uL 7.1 3.9 7.6   ANC 1.4 - 7.0 x10E3/uL  1.9 4.1   HGB 12.6 - 17.7 g/dL 14.8 13.3 13.9    - 379 x10E3/uL 62 (LL) 65 (LL) 70 (L)   INR 0.8 - 1.2 1.3 (H) 1.3 (H)    AST 0 - 40 IU/L 105 (H) 152 (H) 110 (H)   ALT 0 - 44 IU/L 57 (H) 89 (H) 85 (H)   Alk Phos 39 - 117 IU/L 89 81 100   Bili, Total 0.0 - 1.2 mg/dL 4.0 (H) 2.2 (H) 3.2 (H)   Bili, Direct 0.00 - 0.40 mg/dL 1.88 (H) 1.25 (H)    Albumin 3.5 - 5.5 g/dL 3.1 (L) 3.1 (L) 2.7 (L)   BUN 6 - 24 mg/dL 10 12 8   Creat 0.76 - 1.27 mg/dL 0.70 (L) 0.77 0.82   Na 134 - 144 mmol/L 141 143 143   K 3.5 - 5.2 mmol/L 4.5 4.6 4.0   Cl 96 - 106 mmol/L 103 105 110 (H)   CO2 18 - 29 mmol/L 26 26 24   Glucose 65 - 99 mg/dL 112 (H) 89 82     Cancer Screening Latest Ref Rng & Units 2/6/2017   AFP, Serum 0.0 - 8.0 ng/mL 4.1   AFP-L3% 0.0 - 9.9 % 9.6   Additional lab values drawn at today's office visit are pending at the time of documentation. SEROLOGIES:  Serologies Latest Ref Rng & Units 2/6/2017   Hep A Ab, Total Negative Negative   Hep B Surface Ag Negative Negative   Hep B Core Ab, Total Negative Positive (A)   Hep B Surface AB QL  Reactive   Hep C Genotype  2b   HCV RT-PCR, Quant IU/mL 13504   Ferritin 30 - 400 ng/mL 464 (H)   Iron % Saturation 15 - 55 % 43     LIVER HISTOLOGY:  Not available or performed    ENDOSCOPIC PROCEDURES:  Not available or performed, s/p TIPS    RADIOLOGY:  2/2017.   Ultrasound of liver.   Echogenic consistent with cirrhosis. No liver mass lesions. No dilated bile ducts. No ascites. TIPS is patent. OTHER TESTING:  Not available or performed    ASSESSMENT AND PLAN:  Chronic HCV in the setting of cirrhosis (clinically). Liver function is depressed. Total bilirubin is elevated. Serum albumin is depressed. The platelet count is depressed. Based upon laboratory studies the patient appears to have cirrhosis. I have discussed with the patient that this will likely need to be confirmed with fibroscan imaging, this will be scheduled for his next office visit. The Fibroscan can assess liver fibrosis and determine if a patient has advanced fibrosis or cirrhosis without the need for liver biopsy. The Fibroscan is currently available at liver [place]. Will perform laboratory testing to monitor liver function and degree of liver injury. This included BMP, hepatic panel, CBC with platelet count, INR. Patient has had a depressed function score at his last office visit. Likely due to ongoing alcohol use. He has tried to cut back and I will obtain function at this time. Serologic and virologic studies to assess for other causes of chronic liver disease were negative. The patient has genotype 2b infection. The patient has not previously been treated for HCV. We have discussed the treatment alternatives. The SVR/cure rate for HCV now exceeds 90% with just oral anti-viral therapy and no interferon injections or significant side effects for most patients with HCV. The specific treatment is dependent upon genotype, viral load and histology. I have reviewed with him that given his underlying mildly decompensated liver disease that use of Epclusa in combination with ribavirin would prove to be the most reasonable approach to management of his HCV infection. This could only be used once he has committed to being drug and alcohol free.   I will continue to assess at future follow-up office visits. The patient has chronic back pain. He had been taking narcotics and alcohol for pain control. He currently has no other long-term strategy for pain management. I have asked him to contact his PCP office for referral to chronic pain management services. This will heavily influence our plan of care as no medication for HCV or liver disease can be pursued without resolution of his abuse of these substances. There is no contraindication for him to be treated any narcotic pain medication, suboxone, or methadone as necessary to get him to stop using illicit drugs. If he continues to use drugs he would not be a good candidate for HCV treatment as there would be a significant risk of reexposure to HCV. The patient was directed to continue all current medications at the current dosages. There are no contraindications for the patient to take any medications that are necessary for treatment of other medical issues. Ascites has resolved following TIPS placement and diuretics. Lower extremity edema persists to a minimal degree despite TIPs and current dose of diuretics. Hepatic encephalopathy has recently returned as he has been noncompliant with lactulose. I have discussed with him how to titrate this medication to achieve the target dose of 2-3 bowel movements daily. No need to restrict dietary protein at this time. The patient was counseled regarding alcohol consumption. I have advised that he must stop all alcohol in no uncertain terms. The patient was counseled regarding use of illicit drugs. He will need an alternate long-term strategy for management of his chronic back pain. Vaccination for viral hepatitis A is recommended since the patient has no serologic evidence of previous exposure or vaccination with immunity. Vaccination for viral hepatitis B is not needed.   The patient has serologic evidence of prior exposure or vaccination with amy [facility]. screening was recently performed. AFP and repeat US will continued to be monitored in the future. Next ultrasound with duplex will be scheduled for 8/2017. 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 No treatment necessary. Platelet count is adequate     All of the above issues were discussed with the patient. All questions were answered. The patient expressed a clear understanding of the above. [facility] 87 in 2-3 weeks with fibroscan.     Millie Bosch PA-C  [facility] [place] [address], [address] [address]  [address].  [phone]

## 2017-05-20 LAB
ALBUMIN SERPL-MCNC: 3 G/DL (ref 3.5–5.5)
ALP SERPL-CCNC: 75 IU/L (ref 39–117)
ALT SERPL-CCNC: 27 IU/L (ref 0–44)
AST SERPL-CCNC: 61 IU/L (ref 0–40)
BILIRUB DIRECT SERPL-MCNC: 1.3 MG/DL (ref 0–0.4)
BILIRUB SERPL-MCNC: 3.8 MG/DL (ref 0–1.2)
BUN SERPL-MCNC: 10 MG/DL (ref 6–24)
BUN/CREAT SERPL: 14 (ref 9–20)
CALCIUM SERPL-MCNC: 9.5 MG/DL (ref 8.7–10.2)
CHLORIDE SERPL-SCNC: 104 MMOL/L (ref 96–106)
CO2 SERPL-SCNC: 25 MMOL/L (ref 18–29)
CREAT SERPL-MCNC: 0.72 MG/DL (ref 0.76–1.27)
ERYTHROCYTE [DISTWIDTH] IN BLOOD BY AUTOMATED COUNT: 14.2 % (ref 12.3–15.4)
GLUCOSE SERPL-MCNC: 127 MG/DL (ref 65–99)
HCT VFR BLD AUTO: 39.8 % (ref 37.5–51)
HGB BLD-MCNC: 13.7 G/DL (ref 12.6–17.7)
INR PPP: 1.3 (ref 0.8–1.2)
MCH RBC QN AUTO: 32.2 PG (ref 26.6–33)
MCHC RBC AUTO-ENTMCNC: 34.4 G/DL (ref 31.5–35.7)
MCV RBC AUTO: 94 FL (ref 79–97)
MORPHOLOGY BLD-IMP: ABNORMAL
PLATELET # BLD AUTO: 53 X10E3/UL (ref 150–379)
POTASSIUM SERPL-SCNC: 4.1 MMOL/L (ref 3.5–5.2)
PROTHROMBIN TIME: 13 SEC (ref 9.1–12)
RBC # BLD AUTO: 4.25 X10E6/UL (ref 4.14–5.8)
SODIUM SERPL-SCNC: 142 MMOL/L (ref 134–144)
WBC # BLD AUTO: 5.1 X10E3/UL (ref 3.4–10.8)

## 2017-05-22 NOTE — PROGRESS NOTES
Pt notified of results, stable from the past evaluation. Will discuss further at his upcoming visit, advised complete abstinence from alcohol.

## 2017-07-25 ENCOUNTER — OFFICE VISIT (OUTPATIENT)
Dept: HEMATOLOGY | Age: 57
End: 2017-07-25

## 2017-07-25 VITALS
HEART RATE: 88 BPM | TEMPERATURE: 99.2 F | DIASTOLIC BLOOD PRESSURE: 97 MMHG | WEIGHT: 175.8 LBS | OXYGEN SATURATION: 98 % | BODY MASS INDEX: 25.22 KG/M2 | SYSTOLIC BLOOD PRESSURE: 141 MMHG

## 2017-07-25 DIAGNOSIS — K74.60 CIRRHOSIS OF LIVER WITHOUT ASCITES, UNSPECIFIED HEPATIC CIRRHOSIS TYPE (HCC): Primary | ICD-10-CM

## 2017-07-25 RX ORDER — LACTULOSE 10 G/15ML
20 SOLUTION ORAL; RECTAL DAILY
Qty: 960 ML | Refills: 3 | Status: SHIPPED | OUTPATIENT
Start: 2017-07-25

## 2017-07-25 NOTE — MR AVS SNAPSHOT
Visit Information Date & Time Provider Department Dept. Phone Encounter #  
 7/25/2017  9:00 AM [facility] [phone] [id number] Follow-up Instructions Return in about 6 weeks (around 9/5/2017) for Abner Pozo. Upcoming Health Maintenance Date Due Pneumococcal 19-64 Medium Risk (1 of 1 - PPSV23) 8/26/1980 DTaP/Tdap/Td series (1 - Tdap) 8/26/1982 FOBT Q 1 YEAR AGE 50-75 8/26/2011 INFLUENZA AGE 9 TO ADULT 8/1/2017 Allergies as of 7/25/2017  Review Complete On: 7/25/2017 By: GAVINO Gomes No Known Allergies Current Immunizations  Never Reviewed No immunizations on file. Not reviewed this visit You Were Diagnosed With   
  
 Codes Comments Cirrhosis of liver without ascites, unspecified hepatic cirrhosis type ([facility]    -  Primary ICD-10-CM: K74.60 ICD-9-CM: 571.5 Vitals BP Pulse Temp Weight(growth percentile) SpO2 BMI  
 (!) 141/97 88 99.2 °F (37.3 °C) (Tympanic) 175 lb 12.8 oz (79.7 kg) 98% 25.22 kg/m2 Smoking Status Current Every Day Smoker BMI and BSA Data Body Mass Index Body Surface Area  
 25.22 kg/m 2 1.98 m 2 Preferred Pharmacy Pharmacy Name Phone [facility] [address] [place] [address] [phone] Your Updated Medication List  
  
   
This list is accurate as of: 7/25/17  9:42 AM.  Always use your most recent med list.  
  
  
  
  
 baclofen 10 mg tablet Commonly known as:  LIORESAL Take 1 Tab by mouth nightly. furosemide 40 mg tablet Commonly known as:  LASIX Take 1 Tab by mouth daily. lactulose 10 gram/15 mL solution Commonly known as:  Jerrald Grosser Take 30 mL by mouth daily. spironolactone 50 mg tablet Commonly known as:  ALDACTONE Take 2 Tabs by mouth daily. Prescriptions Sent to Pharmacy  Refills  
 lactulose (CHRONULAC) 10 gram/15 mL solution 3  
 Sig: Take 30 mL by mouth daily. Class: Normal  
 [facility] [address] [facility]: [phone] Route: Oral  
  
We Performed the Following AFP WITH AFP-L3% [[id number] Custom] AMMONIA [id number] CPT(R)] CBC W/O DIFF [28285 CPT(R)] ETHYL ALCOHOL [id number] CPT(R)] HEPATIC FUNCTION PANEL (6) [[id number] Custom] LIVER ELASTOGRAPHY W/O IMAG W/I&R [28373 CPT(R)] METABOLIC PANEL, BASIC [81455 CPT(R)] PROTHROMBIN TIME + INR [48943 CPT(R)] Follow-up Instructions Return in about 6 weeks (around 9/5/2017) for Talisha Johns. Introducing [facility]! Dear Yolie Hobbs: Thank you for requesting a [employer] account. Our records indicate that you already have an active [employer] account. You can access your account anytime at [email or web address] Did you know that you can access your hospital and ER discharge instructions at any time in [employer]? You can also review all of your test results from your hospital stay or ER visit. Additional Information If you have questions, please visit the Frequently Asked Questions section of the [employer] website at [email or web address] Remember, [employer] is NOT to be used for urgent needs. For medical emergencies, dial 911. Now available from your iPhone and Android! Please provide this summary of care documentation to your next provider. Your primary care clinician is listed as Suma Hernandez. If you have any questions after today's visit, please call [phone].

## 2017-07-25 NOTE — PROGRESS NOTES
93 Linda Gonzales MD, JEREMI Jiang PA-C Gladies Mitchell, MD, MD Tamar Mckinnon NP Jesusa Avers, NP        [address] [facility], [address] [address]  22.     [phone]     FAX: [address] [place] [address], [address] [place] [address], [address] [facility] [address]     [phone]     FAX: [phone]         Patient Care Team:  Gabriela Miles MD as PCP - General (Family Practice)  Madisyn Doran MD (Physical Medicine and Rehab)      Problem List  Date Reviewed: 5/19/2017          Codes Class Noted    Chronic hepatitis C without hepatic coma [facility]) ICD-10-CM: B18.2  ICD-9-CM: 070.54  2/6/2017        Cirrhosis of liver without ascites ([facility] ICD-10-CM: K74.60  ICD-9-CM: 571.5  2/6/2017        Thrombocytopenia ([facility] ICD-10-CM: D69.6  ICD-9-CM: 287.5  2/6/2017        Chronic midline low back pain ICD-10-CM: M54.5, G89.29  ICD-9-CM: 724.2, 338.29  2/6/2017        Ascites due to alcoholic cirrhosis ([facility] OXA-62-GO: K70.31  ICD-9-CM: 571.2  2/6/2017        S/P TIPS (transjugular intrahepatic portosystemic shunt) ICD-10-CM: A31.257  ICD-9-CM: V45.89  2/6/2017        COPD (chronic obstructive pulmonary disease) ([facility] ICD-10-CM: J44.9  ICD-9-CM: 076  2/6/2017              Jass Roger returns to the [address] for management of cirrhosis secondary to chronic HCV and alcoholic liver disease. The active problem list, all pertinent past medical history, medications, radiologic findings and laboratory findings related to the liver disorder were reviewed with the patient. The patient is a 55 y.o.  male who was noted to have abnormalities in liver chemistries and subsequently tested positive for chronic HCV in 2012. Risk factors for acquiring HCV are IV drug use in 1970-80s.         Ascites developed for the first time in 2012. Ascites was refractory to diuretics and treated with resolved with TIPS. Edema has largely resolved with with diuretics and placement of TIPS. He is variable in administration of low dose diuretics due to cramping associated with these medications, and reports that he has not been taking these medications recently. The patient has had variable hepatic encephalopathy due to the placement of the TIPS. He has a prescription for lactulose that he takes only on an intermittent basis. He relates that he has been out of this medication for the past 7 days. He has been treated in the past with rifaxamin, but did not see that this medication was of any added benefit and discontinued its use. He continues to have some days of increasing confusion and falling episodes that he relates to his back and LE weakness. Patient reports that he usually has 1-2 bowel movement daily. Imaging of the liver was recently performed in 2/2017, this showed the TIPS to be patent and no evidence of liver mass/lesion. The patient has never received treatment for chronic HCV. There have been some delays due to underlying liver function and lab findings. He in interested in starting medications when possible. The most recent laboratory studies indicate that the liver transaminases are elevated, alkaline phosphatase is normal,tests of hepatic synthetic and metabolic function are depressed, total bilirubin is elevated, INR is elevated, albumin is  depressed, and the platelet count is depressed. The patient notes fatigue, diffuse abdominal pain, and chronic back pain issues. The patient has not experienced problems concentrating, swelling of the abdomen, swelling of the lower extremities, hematemesis, hematochezia. The patient has limitations in functional activities which can be attributed to the liver disease.   and to other medical problems that are not related to the liver disease. Of note, patient has had recent heroin possession charges and has upcoming court hearing. He has been using alcohol and heroin for management of back pain. ALLERGIES  No Known Allergies    MEDICATIONS  Current Outpatient Prescriptions   Medication Sig    lactulose (CHRONULAC) 10 gram/15 mL solution Take 30 mL by mouth daily.  furosemide (LASIX) 40 mg tablet Take 1 Tab by mouth daily.  spironolactone (ALDACTONE) 50 mg tablet Take 2 Tabs by mouth daily. (Patient taking differently: Take 50 mg by mouth daily.)    baclofen (LIORESAL) 10 mg tablet Take 1 Tab by mouth nightly. No current facility-administered medications for this visit. SYSTEM REVIEW NOT RELATED TO LIVER DISEASE OR REVIEWED ABOVE:  Constitution systems: Negative for fever, chills, weight gain. Weight stable. Eyes: Negative for visual changes. ENT: Negative for sore throat, painful swallowing. Respiratory: Negative for cough, hemoptysis, SOB. Cardiology: Negative for chest pain, palpitations. GI:  Negative for constipation or diarrhea. : Negative for urinary frequency, dysuria, hematuria, nocturia. Skin: Negative for rash. Hematology: Negative for easy bruising, blood clots. Musculo-skeletal: Severe chronic back pain. Neurologic: Negative for headaches, dizziness, vertigo, memory problems not related to HE. Psychology: Negative for anxiety, depression. FAMILY HISTORY:  The father  of at age 80 years. The mother  of heart disease. There is no family history of liver disease. SOCIAL HISTORY:  The patient is . The patient has 2 children, and 1 grandchildren. The patient currently smokes 4-6 cigarettes daily. The patient consumes ~3 alcoholic beverages per day. The patient is currently receiving disability.       PHYSICAL EXAMINATION:  Visit Vitals    BP (!) 141/97    Pulse 88    Temp 99.2 °F (37.3 °C) (Tympanic)    Wt 175 lb 12.8 oz (79.7 kg)    SpO2 98%    BMI 25.22 kg/m2     General: No acute distress. Eyes: Sclera anicteric. ENT: No oral lesions. Thyroid normal.  Nodes: No adenopathy. Skin: No spider angiomata. No jaundice. No palmar erythema. Respiratory: Lungs clear to auscultation. Cardiovascular: Regular heart rate. No murmurs. No JVD. Abdomen: Soft non-tender. Liver size normal to percussion/palpation. Spleen not palpable. No obvious ascites. Extremities: Trace edema. No muscle wasting. No gross arthritic changes. Neurologic: Alert and oriented. Cranial nerves grossly intact. Fine asterixis. LABORATORY STUDIES:  [facility] of [address] [place] [address] & Units 5/19/2017 3/31/2017 2/6/2017   WBC 3.4 - 10.8 x10E3/uL 5.1 7.1 3.9   ANC 1.4 - 7.0 x10E3/uL   1.9   HGB 12.6 - 17.7 g/dL 13.7 14.8 13.3    - 379 x10E3/uL 53 (LL) 62 (LL) 65 (LL)   INR 0.8 - 1.2 1.3 (H) 1.3 (H) 1.3 (H)   AST 0 - 40 IU/L 61 (H) 105 (H) 152 (H)   ALT 0 - 44 IU/L 27 57 (H) 89 (H)   Alk Phos 39 - 117 IU/L 75 89 81   Bili, Total 0.0 - 1.2 mg/dL 3.8 (H) 4.0 (H) 2.2 (H)   Bili, Direct 0.00 - 0.40 mg/dL 1.30 (H) 1.88 (H) 1.25 (H)   Albumin 3.5 - 5.5 g/dL 3.0 (L) 3.1 (L) 3.1 (L)   BUN 6 - 24 mg/dL 10 10 12   Creat 0.76 - 1.27 mg/dL 0.72 (L) 0.70 (L) 0.77   Na 134 - 144 mmol/L 142 141 143   K 3.5 - 5.2 mmol/L 4.1 4.5 4.6   Cl 96 - 106 mmol/L 104 103 105   CO2 18 - 29 mmol/L 25 26 26   Glucose 65 - 99 mg/dL 127 (H) 112 (H) 89     Cancer Screening Latest Ref Rng & Units 2/6/2017   AFP, Serum 0.0 - 8.0 ng/mL 4.1   AFP-L3% 0.0 - 9.9 % 9.6   Additional lab values drawn at today's office visit are pending at the time of documentation.     SEROLOGIES:  Serologies Latest Ref Rng & Units 2/6/2017   Hep A Ab, Total Negative Negative   Hep B Surface Ag Negative Negative   Hep B Core Ab, Total Negative Positive (A)   Hep B Surface AB QL  Reactive   Hep C Genotype  2b   HCV RT-PCR, Quant IU/mL 89675   Ferritin 30 - 400 ng/mL 464 (H)   Iron % Saturation 15 - 55 % 43     LIVER HISTOLOGY:  7/2017. FibroScan performed at [address]. EkPa was 21.3. Suggested fibrosis level is F4. ENDOSCOPIC PROCEDURES:  Not available or performed, s/p TIPS    RADIOLOGY:  2/2017. Ultrasound of liver. Echogenic consistent with cirrhosis. No liver mass lesions. No dilated bile ducts. No ascites. TIPS is patent. OTHER TESTING:  Not available or performed    ASSESSMENT AND PLAN:  Chronic HCV in the setting of cirrhosis. Liver function is depressed. Total bilirubin is elevated. Serum albumin is depressed. The platelet count is depressed. Based upon laboratory studies the patient appears to have cirrhosis. Will perform laboratory testing to monitor liver function and degree of liver injury. This included BMP, hepatic panel, CBC with platelet count, INR. Patient has had a depressed function score at his last office visit. Likely due to ongoing alcohol use. He has tried to cut back and I will obtain function at this time. Serologic and virologic studies to assess for other causes of chronic liver disease were negative. The patient has genotype 2b infection. The patient has not previously been treated for HCV. We have discussed the treatment alternatives. The SVR/cure rate for HCV now exceeds 90% with just oral anti-viral therapy and no interferon injections or significant side effects for most patients with HCV. The specific treatment is dependent upon genotype, viral load and histology. I have reviewed with him that given his underlying mildly decompensated liver disease that use of Epclusa in combination with ribavirin would prove to be the most reasonable approach to management of his HCV infection. This could only be used once he has committed to being drug and alcohol free. I will continue to assess at future follow-up office visits. The patient has chronic back pain. He had been taking narcotics and alcohol for pain control.   He currently has no other long-term strategy for pain management. He does appear ready for rehabilitation and I have encouraged him to seek this in conjunction with consultation of PCP. I have asked him to contact his PCP office for referral to chronic pain management services. This will heavily influence our plan of care as no medication for HCV or liver disease can be pursued without resolution of his abuse of these substances. There is no contraindication for him to be treated any narcotic pain medication, suboxone, or methadone as necessary to get him to stop using illicit drugs. If he continues to use drugs he would not be a good candidate for HCV treatment as there would be a significant risk of reexposure to HCV. The patient was directed to continue all current medications at the current dosages. There are no contraindications for the patient to take any medications that are necessary for treatment of other medical issues. Ascites has resolved following TIPS placement and diuretics. Lower extremity edema persists to a minimal degree despite TIPs and current dose of diuretics. Hepatic encephalopathy has recently returned as he has been noncompliant with lactulose. I have discussed with him how to titrate this medication to achieve the target dose of 2-3 bowel movements daily. No need to restrict dietary protein at this time. The patient was counseled regarding alcohol consumption. I have advised that he must stop all alcohol in no uncertain terms. The patient was counseled regarding use of illicit drugs. He will need an alternate long-term strategy for management of his chronic back pain. Vaccination for viral hepatitis A is recommended since the patient has no serologic evidence of previous exposure or vaccination with immunity. Vaccination for viral hepatitis B is not needed. The patient has serologic evidence of prior exposure or vaccination with immunity.     [address] screening was recently performed. AFP and repeat US will continued to be monitored in the future. Next ultrasound with duplex will be scheduled for 8/2017, I have ordered this for the same day as follow-up office visit. 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 No treatment necessary. Platelet count is adequate     All of the above issues were discussed with the patient. All questions were answered. The patient expressed a clear understanding of the above. [address] [address] in 6 weeks with doppler US.     Michael Smith PA-C  [facility] of [address] [place] [address], [address] [facility] 22.  [phone]

## 2017-07-26 LAB
AFP L3 MFR SERPL: 9.6 % (ref 0–9.9)
AFP SERPL-MCNC: 7 NG/ML (ref 0–8)
ALBUMIN SERPL-MCNC: 3.1 G/DL (ref 3.5–5.5)
ALP SERPL-CCNC: 86 IU/L (ref 39–117)
ALT SERPL-CCNC: 32 IU/L (ref 0–44)
AMMONIA PLAS-MCNC: 80 UG/DL (ref 27–102)
AST SERPL-CCNC: 63 IU/L (ref 0–40)
BILIRUB DIRECT SERPL-MCNC: 1.27 MG/DL (ref 0–0.4)
BILIRUB SERPL-MCNC: 2.6 MG/DL (ref 0–1.2)
BUN SERPL-MCNC: 9 MG/DL (ref 6–24)
BUN/CREAT SERPL: 13 (ref 9–20)
CALCIUM SERPL-MCNC: 9.1 MG/DL (ref 8.7–10.2)
CHLORIDE SERPL-SCNC: 104 MMOL/L (ref 96–106)
CO2 SERPL-SCNC: 26 MMOL/L (ref 18–29)
CREAT SERPL-MCNC: 0.72 MG/DL (ref 0.76–1.27)
ERYTHROCYTE [DISTWIDTH] IN BLOOD BY AUTOMATED COUNT: 14.7 % (ref 12.3–15.4)
ETHANOL BLD GC-MCNC: NEGATIVE %
GLUCOSE SERPL-MCNC: 133 MG/DL (ref 65–99)
HCT VFR BLD AUTO: 41.1 % (ref 37.5–51)
HGB BLD-MCNC: 13.9 G/DL (ref 12.6–17.7)
INR PPP: 1.3 (ref 0.8–1.2)
MCH RBC QN AUTO: 32.9 PG (ref 26.6–33)
MCHC RBC AUTO-ENTMCNC: 33.8 G/DL (ref 31.5–35.7)
MCV RBC AUTO: 97 FL (ref 79–97)
MORPHOLOGY BLD-IMP: ABNORMAL
PLATELET # BLD AUTO: 50 X10E3/UL (ref 150–379)
POTASSIUM SERPL-SCNC: 4.2 MMOL/L (ref 3.5–5.2)
PROTHROMBIN TIME: 13.5 SEC (ref 9.1–12)
RBC # BLD AUTO: 4.22 X10E6/UL (ref 4.14–5.8)
SODIUM SERPL-SCNC: 144 MMOL/L (ref 134–144)
WBC # BLD AUTO: 4 X10E3/UL (ref 3.4–10.8)

## 2017-08-01 NOTE — PROGRESS NOTES
Pt notified of results, slight improvement in bilirubin. Will repeat at follow-up, patient is continuing to work at [employer].

## 2017-09-14 ENCOUNTER — OFFICE VISIT (OUTPATIENT)
Dept: HEMATOLOGY | Age: 57
End: 2017-09-14

## 2017-09-14 VITALS
DIASTOLIC BLOOD PRESSURE: 81 MMHG | SYSTOLIC BLOOD PRESSURE: 131 MMHG | WEIGHT: 182.4 LBS | OXYGEN SATURATION: 97 % | BODY MASS INDEX: 26.17 KG/M2 | HEART RATE: 89 BPM | TEMPERATURE: 99 F

## 2017-09-14 DIAGNOSIS — K74.60 CIRRHOSIS OF LIVER WITHOUT ASCITES, UNSPECIFIED HEPATIC CIRRHOSIS TYPE (HCC): Primary | ICD-10-CM

## 2017-09-14 NOTE — PROGRESS NOTES
93 Linda Gonzales MD, JEREMI Tatum PA-C Keane Latin, MD, MD Tamar Navas NP Aletta Dotter, NP        [address] [facility], [address] Anne Lomax  22.     [phone]     FAX: [address] [place] [address], [address] [place] [address], [address] [facility] [address]     [phone]     FAX: [phone]         Patient Care Team:  Niru Figueroa MD as PCP - General (Family Practice)  Ange Burgos MD (Physical Medicine and Rehab)      Problem List  Date Reviewed: 7/25/2017          Codes Class Noted    Chronic hepatitis C without hepatic coma [facility]) ICD-10-CM: B18.2  ICD-9-CM: 070.54  2/6/2017        Cirrhosis of liver without ascites ([facility] ICD-10-CM: K74.60  ICD-9-CM: 571.5  2/6/2017        Thrombocytopenia ([facility].) ICD-10-CM: D69.6  ICD-9-CM: 287.5  2/6/2017        Chronic midline low back pain ICD-10-CM: M54.5, G89.29  ICD-9-CM: 724.2, 338.29  2/6/2017        Ascites due to alcoholic cirrhosis ([facility] [id number]-[id number]: K70.31  ICD-9-CM: 571.2  2/6/2017        S/P TIPS (transjugular intrahepatic portosystemic shunt) ICD-10-CM: T04.431  ICD-9-CM: V45.89  2/6/2017        COPD (chronic obstructive pulmonary disease) ([facility] ICD-10-CM: J44.9  ICD-9-CM: 602  2/6/2017              Arun Guillen returns to the [address] for management of cirrhosis secondary to chronic HCV and alcoholic liver disease. The active problem list, all pertinent past medical history, medications, radiologic findings and laboratory findings related to the liver disorder were reviewed with the patient. The patient is a 65 y.o.  male who was noted to have abnormalities in liver chemistries and subsequently tested positive for chronic HCV in 2012. Risk factors for acquiring HCV are IV drug use in 1970-80s.         Ascites developed for the first time in 2012. Ascites was refractory to diuretics and treated with resolved with TIPS. Edema has largely resolved with with diuretics and placement of TIPS. He is variable in administration of low dose diuretics due to cramping associated with these medications, and reports that he has not been taking these medications recently. He has noted some mild increase in abdominal distention. The patient has had variable hepatic encephalopathy due to the placement of the TIPS. He has a prescription for lactulose that he takes only on an intermittent basis as dictated by bowel output. He has been treated in the past with rifaxamin, but did not see that this medication was of any added benefit and discontinued its use. He continues to have some days of increasing confusion and falling episodes that he relates to his back and LE weakness. Patient reports that he usually has 1-2 bowel movement daily. Imaging of the liver was recently performed in 2/2017, this showed the TIPS to be patent and no evidence of liver mass/lesion. I had ordered doppler US at the last office visit, this has not been scheduled. The patient has never received treatment for chronic HCV. There have been some delays due to underlying liver function and lab findings. He in interested in starting medications when possible. The most recent laboratory studies indicate that the liver transaminases are elevated, alkaline phosphatase is normal,tests of hepatic synthetic and metabolic function are depressed, total bilirubin is elevated, INR is elevated, albumin is  depressed, and the platelet count is depressed. The patient notes fatigue, diffuse abdominal pain, and chronic back pain issues. The patient has not experienced problems concentrating, swelling of the abdomen, swelling of the lower extremities, hematemesis, hematochezia.   The patient has limitations in functional activities which can be attributed to the liver disease. and to other medical problems that are not related to the liver disease. Of note, patient has had recent heroin possession charges and has upcoming court hearing. He has been using alcohol and heroin for management of back pain and continues to use alcohol. The longest he has been able to go without drinking is 5 days. He has participated in NA and AA in the past.    ALLERGIES  No Known Allergies    MEDICATIONS  Current Outpatient Prescriptions   Medication Sig    lactulose (CHRONULAC) 10 gram/15 mL solution Take 30 mL by mouth daily.  furosemide (LASIX) 40 mg tablet Take 1 Tab by mouth daily.  spironolactone (ALDACTONE) 50 mg tablet Take 2 Tabs by mouth daily. (Patient taking differently: Take 50 mg by mouth daily.)    baclofen (LIORESAL) 10 mg tablet Take 1 Tab by mouth nightly. No current facility-administered medications for this visit. SYSTEM REVIEW NOT RELATED TO LIVER DISEASE OR REVIEWED ABOVE:  Constitution systems: Negative for fever, chills, weight gain. Weight stable. Eyes: Negative for visual changes. ENT: Negative for sore throat, painful swallowing. Respiratory: Negative for cough, hemoptysis, SOB. Cardiology: Negative for chest pain, palpitations. GI:  Negative for constipation or diarrhea. : Negative for urinary frequency, dysuria, hematuria, nocturia. Skin: Negative for rash. Hematology: Negative for easy bruising, blood clots. Musculo-skeletal: Severe chronic back pain. Neurologic: Negative for headaches, dizziness, vertigo, memory problems not related to HE. Psychology: Negative for anxiety, depression. FAMILY HISTORY:  The father  of at age 80 years. The mother  of heart disease. There is no family history of liver disease. SOCIAL HISTORY:  The patient is . The patient has 2 children, and 1 grandchildren. The patient currently smokes 4-6 cigarettes daily.     The patient consumes ~3 alcoholic beverages per day. The patient is currently receiving disability. PHYSICAL EXAMINATION:  Visit Vitals    /81    Pulse 89    Temp 99 °F (37.2 °C) (Tympanic)    Wt 182 lb 6.4 oz (82.7 kg)    SpO2 97%    BMI 26.17 kg/m2     General: No acute distress. Eyes: Sclera anicteric. ENT: No oral lesions. Thyroid normal.  Nodes: No adenopathy. Skin: No spider angiomata. No jaundice. No palmar erythema. Respiratory: Lungs clear to auscultation. Cardiovascular: Regular heart rate. No murmurs. No JVD. Abdomen: Soft non-tender. Liver size normal to percussion/palpation. Spleen not palpable. No obvious ascites. Extremities: Trace edema. No muscle wasting. No gross arthritic changes. Neurologic: Alert and oriented. Cranial nerves grossly intact. Fine asterixis. LABORATORY STUDIES:  [address] Units 7/25/2017 5/19/2017 3/31/2017   WBC 3.4 - 10.8 x10E3/uL 4.0 5.1 7.1   ANC 1.4 - 7.0 x10E3/uL      HGB 12.6 - 17.7 g/dL 13.9 13.7 14.8    - 379 x10E3/uL 50 (LL) 53 (LL) 62 (LL)   INR 0.8 - 1.2 1.3 (H) 1.3 (H) 1.3 (H)   AST 0 - 40 IU/L 63 (H) 61 (H) 105 (H)   ALT 0 - 44 IU/L 32 27 57 (H)   Alk Phos 39 - 117 IU/L 86 75 89   Bili, Total 0.0 - 1.2 mg/dL 2.6 (H) 3.8 (H) 4.0 (H)   Bili, Direct 0.00 - 0.40 mg/dL 1.27 (H) 1.30 (H) 1.88 (H)   Albumin 3.5 - 5.5 g/dL 3.1 (L) 3.0 (L) 3.1 (L)   BUN 6 - 24 mg/dL 9 10 10   Creat 0.76 - 1.27 mg/dL 0.72 (L) 0.72 (L) 0.70 (L)   Na 134 - 144 mmol/L 144 142 141   K 3.5 - 5.2 mmol/L 4.2 4.1 4.5   Cl 96 - 106 mmol/L 104 104 103   CO2 18 - 29 mmol/L 26 25 26   Glucose 65 - 99 mg/dL 133 (H) 127 (H) 112 (H)   Ammonia 27 - 102 ug/dL 80       Cancer Screening Latest Ref Rng & Units 7/25/2017 2/6/2017   AFP, Serum 0.0 - 8.0 ng/mL 7.0 4.1   AFP-L3% 0.0 - 9.9 % 9.6 9.6   Additional lab values drawn at today's office visit are pending at the time of documentation.     SEROLOGIES:  Serologies Latest Ref Rng & Units 2/6/2017   Hep A Ab, Total Negative Negative   Hep B Surface Ag Negative Negative   Hep B Core Ab, Total Negative Positive (A)   Hep B Surface AB QL  Reactive   Hep C Genotype  2b   HCV RT-PCR, Quant IU/mL 91463   Ferritin 30 - 400 ng/mL 464 (H)   Iron % Saturation 15 - 55 % 43     LIVER HISTOLOGY:  7/2017. FibroScan performed at [address]. EkPa was 21.3. Suggested fibrosis level is F4. ENDOSCOPIC PROCEDURES:  Not available or performed, s/p TIPS    RADIOLOGY:  2/2017. Ultrasound of liver. Echogenic consistent with cirrhosis. No liver mass lesions. No dilated bile ducts. No ascites. TIPS is patent. OTHER TESTING:  Not available or performed    ASSESSMENT AND PLAN:  Chronic HCV and alcohol in the setting of cirrhosis. Liver function is depressed. Total bilirubin is elevated. Serum albumin is depressed. The platelet count is depressed. Based upon laboratory studies the patient appears to have cirrhosis. Will perform laboratory testing to monitor liver function and degree of liver injury. This included BMP, hepatic panel, CBC with platelet count, INR. Patient has had a depressed function score at his last office visit. Likely due to ongoing alcohol use. He has tried to cut back in the past but continues to drink heavily on a regular basis. I will obtain function at this time. Serologic and virologic studies to assess for other causes of chronic liver disease were negative. The patient has genotype 2b infection. The patient has not previously been treated for HCV. We have discussed the treatment alternatives. The SVR/cure rate for HCV now exceeds 90% with just oral anti-viral therapy and no interferon injections or significant side effects for most patients with HCV. The specific treatment is dependent upon genotype, viral load and histology.   I have reviewed with him that given his underlying decompensated liver disease and ongoing alcohol use, we are unable to start therapy at present. If he can commit to sobriety, we would hope to see stabilization of labs and could then entertain use Epclusa for management of his HCV infection. This could only be used once he has committed to being drug and alcohol free. I will continue to assess at future follow-up office visits. The patient has chronic back pain. He had been taking narcotics and alcohol for pain control. He currently has no other long-term strategy for pain management. He does appear ready for rehabilitation and I have encouraged him to seek this in conjunction with consultation of PCP. He has a number of names of providers for chronic pain management services and I have encouraged him to contact one of them. This will heavily influence our plan of care as no medication for HCV or liver disease can be pursued without resolution of his abuse of these substances. There is no contraindication for him to be treated any narcotic pain medication, suboxone, or methadone as necessary to get him to stop using illicit drugs. If he continues to use drugs he would not be a good candidate for HCV treatment as there would be a significant risk of reexposure to HCV. The patient was directed to continue all current medications at the current dosages. There are no contraindications for the patient to take any medications that are necessary for treatment of other medical issues. Ascites has resolved following TIPS placement and diuretics. Lower extremity edema persists to a minimal degree despite TIPs and current dose of diuretics. I have ordered him to obtain US of the TIPS for patency evaluation. Hepatic encephalopathy has recently returned as he has been noncompliant with lactulose. I have discussed with him how to titrate this medication to achieve the target dose of 2-3 bowel movements daily. No need to restrict dietary protein at this time. The patient was counseled regarding alcohol consumption.   I have advised that he must stop all alcohol in no uncertain terms. The patient was counseled regarding use of illicit drugs. He will need an alternate long-term strategy for management of his chronic back pain. Vaccination for viral hepatitis A is recommended since the patient has no serologic evidence of previous exposure or vaccination with immunity. Vaccination for viral hepatitis B is not needed. The patient has serologic evidence of prior exposure or vaccination with immunity. [address] screening was recently performed. AFP and repeat US will continued to be monitored in the future. Next ultrasound with duplex is now due, patient will call radiology to schedule directly. 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 No treatment necessary. Platelet count is adequate     All of the above issues were discussed with the patient. All questions were answered. The patient expressed a clear understanding of the above. [facility] 87 in 6 weeks with doppler US in the meantime.     Gina Donaldson PA-C  [facility] [address] [place] [address], [address] [facility], [facility] 22.  [phone]

## 2017-09-14 NOTE — MR AVS SNAPSHOT
Visit Information Date & Time Provider Department Dept. Phone Encounter #  
 9/14/2017  2:00 PM Joselito Soares, [address] of [place] [address] [id number] Follow-up Instructions Return in about 6 weeks (around 10/26/2017) for CYNDIE COLLINS. Your Appointments 10/18/2017  2:30 PM  
Follow Up with [facility] [address] ([facility] Appt Note: Follow up [address] [facility] [phone] Critical access hospital [id number]  
[address] [facility] [address] Upcoming Health Maintenance Date Due Pneumococcal 19-64 Medium Risk (1 of 1 - PPSV23) 8/26/1980 DTaP/Tdap/Td series (1 - Tdap) 8/26/1982 FOBT Q 1 YEAR AGE 50-75 8/26/2011 INFLUENZA AGE 9 TO ADULT 8/1/2017 Allergies as of 9/14/2017  Review Complete On: 9/14/2017 By: Lionel Melgoza No Known Allergies Current Immunizations  Never Reviewed No immunizations on file. Not reviewed this visit You Were Diagnosed With   
  
 Codes Comments Cirrhosis of liver without ascites, unspecified hepatic cirrhosis type ([facility]    -  Primary ICD-10-CM: K74.60 ICD-9-CM: 571.5 Vitals BP Pulse Temp Weight(growth percentile) SpO2 BMI  
 131/81 89 99 °F (37.2 °C) (Tympanic) 182 lb 6.4 oz (82.7 kg) 97% 26.17 kg/m2 Smoking Status Current Every Day Smoker BMI and BSA Data Body Mass Index Body Surface Area  
 26.17 kg/m 2 2.02 m 2 Preferred Pharmacy Pharmacy Name Phone [facility] [address] [place] [address] [phone] Your Updated Medication List  
  
   
This list is accurate as of: 9/14/17  2:43 PM.  Always use your most recent med list.  
  
  
  
  
 baclofen 10 mg tablet Commonly known as:  LIORESAL Take 1 Tab by mouth nightly. furosemide 40 mg tablet Commonly known as:  LASIX Take 1 Tab by mouth daily. lactulose 10 gram/15 mL solution Commonly known as:  Cherene Colla Take 30 mL by mouth daily. spironolactone 50 mg tablet Commonly known as:  ALDACTONE Take 2 Tabs by mouth daily. We Performed the Following CBC W/O DIFF [24053 CPT(R)] HEPATIC FUNCTION PANEL (6) [[id number] Custom] METABOLIC PANEL, BASIC [60926 CPT(R)] PROTHROMBIN TIME + INR [51984 CPT(R)] Follow-up Instructions Return in about 6 weeks (around 10/26/2017) for Maxime Montes De Oca. Introducing [facility] & HEALTH SERVICES! Dear Aloma Kanner: Thank you for requesting a [employer] account. Our records indicate that you already have an active [employer] account. You can access your account anytime at [email or web address] Did you know that you can access your hospital and ER discharge instructions at any time in [employer] You can also review all of your test results from your hospital stay or ER visit. Additional Information If you have questions, please visit the Frequently Asked Questions section of the [employer] website at [email or web address] Remember, [employer] is NOT to be used for urgent needs. For medical emergencies, dial 911. Now available from your iPhone and Android! Please provide this summary of care documentation to your next provider. Your primary care clinician is listed as Teresa Braden. If you have any questions after today's visit, please call [phone].

## 2017-09-15 LAB
ALBUMIN SERPL-MCNC: 2.8 G/DL (ref 3.5–5.5)
ALP SERPL-CCNC: 88 IU/L (ref 39–117)
ALT SERPL-CCNC: 47 IU/L (ref 0–44)
AST SERPL-CCNC: 77 IU/L (ref 0–40)
BILIRUB DIRECT SERPL-MCNC: 1.68 MG/DL (ref 0–0.4)
BILIRUB SERPL-MCNC: 3.5 MG/DL (ref 0–1.2)
BUN SERPL-MCNC: 7 MG/DL (ref 6–24)
BUN/CREAT SERPL: 9 (ref 9–20)
CALCIUM SERPL-MCNC: 8.9 MG/DL (ref 8.7–10.2)
CHLORIDE SERPL-SCNC: 101 MMOL/L (ref 96–106)
CO2 SERPL-SCNC: 26 MMOL/L (ref 18–29)
CREAT SERPL-MCNC: 0.74 MG/DL (ref 0.76–1.27)
ERYTHROCYTE [DISTWIDTH] IN BLOOD BY AUTOMATED COUNT: 15.6 % (ref 12.3–15.4)
GLUCOSE SERPL-MCNC: 149 MG/DL (ref 65–99)
HCT VFR BLD AUTO: 40.8 % (ref 37.5–51)
HGB BLD-MCNC: 14.1 G/DL (ref 12.6–17.7)
INR PPP: 1.4 (ref 0.8–1.2)
MCH RBC QN AUTO: 33.3 PG (ref 26.6–33)
MCHC RBC AUTO-ENTMCNC: 34.6 G/DL (ref 31.5–35.7)
MCV RBC AUTO: 96 FL (ref 79–97)
MORPHOLOGY BLD-IMP: ABNORMAL
PLATELET # BLD AUTO: 61 X10E3/UL (ref 150–379)
POTASSIUM SERPL-SCNC: 3.8 MMOL/L (ref 3.5–5.2)
PROTHROMBIN TIME: 14.2 SEC (ref 9.1–12)
RBC # BLD AUTO: 4.24 X10E6/UL (ref 4.14–5.8)
SODIUM SERPL-SCNC: 142 MMOL/L (ref 134–144)
WBC # BLD AUTO: 5.9 X10E3/UL (ref 3.4–10.8)

## 2017-09-20 NOTE — PROGRESS NOTES
Pt notified of continued suppressed liver function. Reiterated the importance of abstinence from alcohol to avoid additional decline in functions.